# Patient Record
Sex: MALE | Race: WHITE | Employment: OTHER | URBAN - METROPOLITAN AREA
[De-identification: names, ages, dates, MRNs, and addresses within clinical notes are randomized per-mention and may not be internally consistent; named-entity substitution may affect disease eponyms.]

---

## 2020-02-14 ENCOUNTER — HOSPITAL ENCOUNTER (OUTPATIENT)
Dept: PREADMISSION TESTING | Age: 80
Discharge: HOME | End: 2020-02-14
Payer: MEDICARE

## 2020-02-14 VITALS
HEART RATE: 87 BPM | BODY MASS INDEX: 20.05 KG/M2 | WEIGHT: 148 LBS | HEIGHT: 72 IN | TEMPERATURE: 98.1 F | SYSTOLIC BLOOD PRESSURE: 127 MMHG | RESPIRATION RATE: 18 BRPM | DIASTOLIC BLOOD PRESSURE: 76 MMHG

## 2020-02-14 LAB
ANION GAP SERPL CALC-SCNC: 9 MMOL/L (ref 5–15)
ATRIAL RATE: 55 BPM
BASOPHILS # BLD: 0 K/UL (ref 0–0.1)
BASOPHILS NFR BLD: 0 % (ref 0–1)
BUN SERPL-MCNC: 28 MG/DL (ref 6–20)
BUN/CREAT SERPL: 18 (ref 12–20)
CALCIUM SERPL-MCNC: 8.6 MG/DL (ref 8.5–10.1)
CALCULATED R AXIS, ECG10: 14 DEGREES
CALCULATED T AXIS, ECG11: 47 DEGREES
CHLORIDE SERPL-SCNC: 99 MMOL/L (ref 97–108)
CO2 SERPL-SCNC: 27 MMOL/L (ref 21–32)
CREAT SERPL-MCNC: 1.54 MG/DL (ref 0.7–1.3)
DIAGNOSIS, 93000: NORMAL
DIFFERENTIAL METHOD BLD: ABNORMAL
EOSINOPHIL # BLD: 0 K/UL (ref 0–0.4)
EOSINOPHIL NFR BLD: 0 % (ref 0–7)
ERYTHROCYTE [DISTWIDTH] IN BLOOD BY AUTOMATED COUNT: 12.5 % (ref 11.5–14.5)
GLUCOSE SERPL-MCNC: 94 MG/DL (ref 65–100)
HCT VFR BLD AUTO: 34.8 % (ref 36.6–50.3)
HGB BLD-MCNC: 11.8 G/DL (ref 12.1–17)
IMM GRANULOCYTES # BLD AUTO: 0.1 K/UL (ref 0–0.04)
IMM GRANULOCYTES NFR BLD AUTO: 1 % (ref 0–0.5)
LYMPHOCYTES # BLD: 0.9 K/UL (ref 0.8–3.5)
LYMPHOCYTES NFR BLD: 7 % (ref 12–49)
MCH RBC QN AUTO: 31.4 PG (ref 26–34)
MCHC RBC AUTO-ENTMCNC: 33.9 G/DL (ref 30–36.5)
MCV RBC AUTO: 92.6 FL (ref 80–99)
MONOCYTES # BLD: 1.4 K/UL (ref 0–1)
MONOCYTES NFR BLD: 11 % (ref 5–13)
NEUTS SEG # BLD: 10.9 K/UL (ref 1.8–8)
NEUTS SEG NFR BLD: 81 % (ref 32–75)
NRBC # BLD: 0 K/UL (ref 0–0.01)
NRBC BLD-RTO: 0 PER 100 WBC
PLATELET # BLD AUTO: 286 K/UL (ref 150–400)
PMV BLD AUTO: 10 FL (ref 8.9–12.9)
POTASSIUM SERPL-SCNC: 2.8 MMOL/L (ref 3.5–5.1)
Q-T INTERVAL, ECG07: 446 MS
QRS DURATION, ECG06: 154 MS
QTC CALCULATION (BEZET), ECG08: 498 MS
RBC # BLD AUTO: 3.76 M/UL (ref 4.1–5.7)
SODIUM SERPL-SCNC: 135 MMOL/L (ref 136–145)
VENTRICULAR RATE, ECG03: 75 BPM
WBC # BLD AUTO: 13.3 K/UL (ref 4.1–11.1)

## 2020-02-14 PROCEDURE — 80048 BASIC METABOLIC PNL TOTAL CA: CPT

## 2020-02-14 PROCEDURE — 93005 ELECTROCARDIOGRAM TRACING: CPT

## 2020-02-14 PROCEDURE — 85025 COMPLETE CBC W/AUTO DIFF WBC: CPT

## 2020-02-14 RX ORDER — DILTIAZEM HYDROCHLORIDE 120 MG/1
120 CAPSULE, EXTENDED RELEASE ORAL DAILY
COMMUNITY

## 2020-02-14 RX ORDER — MELATONIN
2000 DAILY
COMMUNITY

## 2020-02-14 RX ORDER — POTASSIUM CHLORIDE 750 MG/1
10 TABLET, FILM COATED, EXTENDED RELEASE ORAL DAILY
COMMUNITY

## 2020-02-14 RX ORDER — ROSUVASTATIN CALCIUM 10 MG/1
5 TABLET, COATED ORAL
COMMUNITY

## 2020-02-14 RX ORDER — LEVOTHYROXINE SODIUM 100 UG/1
100 TABLET ORAL
COMMUNITY

## 2020-02-14 RX ORDER — ALENDRONATE SODIUM 70 MG/1
70 TABLET ORAL
COMMUNITY

## 2020-02-14 RX ORDER — LANOLIN ALCOHOL/MO/W.PET/CERES
CREAM (GRAM) TOPICAL
COMMUNITY

## 2020-02-14 NOTE — PERIOP NOTES
DO NOT EAT OR DRINK ANYTHING AFTER MIDNIGHT, except as instructed THE NIGHT BEFORE SURGERY. PT GIVEN OPPORTUNITY TO ASK ADDITIONAL QUESTIONS. Patient given two bottles CHG soap and instruction sheet, instructions for use reviewed with patient. Patient given surgical site infection information FAQs handout and hand hygiene tips sheet. Pre-operative instructions reviewed and patient verbalizes understanding of instructions. Patient has been given the opportunity to ask additional questions. DR Armaan Smith OFFICE CALLED REQUESTING MOST RECENT OFFICE NOTES. DR Shira SALOMON OFFICE CALLED REQUESTING MOST RECENT OFFICE NOTES.

## 2020-02-16 NOTE — H&P
EPIC MRN: [id number]      CARE TEAM:  Patient Care Team:  No Pcp as PCP - General (General Practice)     ASSESSMENT:  1. Other closed extra-articular fracture of distal end of left radius, initial encounter          There is no problem list on file for this patient.       PLAN:  Treatment Plan:  We discussed operative and non operative options. Patient and family have requested operative management. ORIF. Regional anesthesia. Discussed reasonable expectations. We discussed usual postoperative course. He has given informed consent to proceed. Ice and elevation in the interim. We were able to remove his ring with some difficulty.         Orders Placed This Encounter    Cast application      Follow-up: Return for first postoperative visit.      HISTORY OF PRESENT ILLNESS:  Chief Complaint: Injury and Fall of the Left Wrist     Age: 78 y.o. Sex: male   History of present illness: Maite Adame a pleasant 78 y.o. male who presents today for evaluation of left wrist injury. He had a recent fall. Pain and swelling. Some deformity. X-rays have revealed a fracture. He has been in a splint. Pain reasonably well controlled. He lives independently with his wife to require some care due to dementia. He does have a cardiac history     Past medical history, past surgical history, medications, allergies, social history, and review of systems have been reviewed by me. No current facility-administered medications on file prior to encounter. No current outpatient medications on file prior to encounter.        Past Medical History:   Diagnosis Date    A-fib [facility])     Acute myocardial ischemia ([facility] [address] 2015    h/o    Adverse effect of anesthesia     SLOW TO WAKE POST ANESTHESIA    Arthritis     Asthma     AS A CHILD    CAD (coronary artery disease)     STENT PLACED (? YEAR) >5 YEARS    Cancer ([facility] [address]     PROSTATE    Chronic kidney disease     h/o, NO LONGER A PROBLEM    [facility] pulmonary disease ([facility]     GERD (gastroesophageal reflux disease)     h/o, NO LONGER A PROBLEM    Hypertension     Psychiatric disorder     NOT CURRENTLY    PUD (peptic ulcer disease)     Thyroid disease     HYPOTHYROID       Allergies   Allergen Reactions    Shellfish Derived Hives       Social History     Socioeconomic History    Marital status:      Spouse name: Not on file    Number of children: Not on file    Years of education: Not on file    Highest education level: Not on file   Occupational History    Not on file   Social Needs    Financial resource strain: Not on file    Food insecurity:     Worry: Not on file     Inability: Not on file    Transportation needs:     Medical: Not on file     Non-medical: Not on file   Tobacco Use    Smoking status: Former Smoker     Packs/day: 1.50     Last attempt to quit:      Years since quittin.1    Smokeless tobacco: Never Used   Substance and Sexual Activity    Alcohol use: Yes     Comment: RARE    Drug use: Not Currently    Sexual activity: Not on file   Lifestyle    Physical activity:     Days per week: Not on file     Minutes per session: Not on file    Stress: Not on file   Relationships    Social connections:     Talks on phone: Not on file     Gets together: Not on file     Attends Protestant service: Not on file     Active member of club or organization: Not on file     Attends meetings of clubs or organizations: Not on file     Relationship status: Not on file    Intimate partner violence:     Fear of current or ex partner: Not on file     Emotionally abused: Not on file     Physically abused: Not on file     Forced sexual activity: Not on file   Other Topics Concern    Not on file   Social History Narrative    Not on file       Allergies   Allergen Reactions    Shellfish Derived Hives        Review of Systems   2020    Constitutional: Unexplained: Negative  Genitourinary: Frequent Urination: Negative  HEENT: Vision Loss: Positive  Neurological: Memory Loss: Positive  Integumentary: Rash: Negative  Cardiovascular: Palpatations: Positive  Hematologic: Bruises/Bleeds Easily: Negative  Gastrointestinal: Constipation: Negative  Immunological: Seasonal Allergies: Positive  Musculoskeletal: Joint Pain: Positive        OBJECTIVE:  Constitutional:  No acute distress. Pleasant and cooperative with exam.  HEENT: Normocephalic. Atraumatic. Eyes are clear  Hearing intact to spoken word   Respiratory:  Breathing unlabored. .  Cardiovascular:  No marked edema. Psychiatric: Alert.  Affect appropriate. Gait: Normal.  Musculoskeletal    Skin is intact  He has deformity of the wrist.  Swelling. Full digital motion. Intact sensibility.     IMAGING / STUDIES:           No imaging obtained       Independently reviewed his x-ray images.   He has a displaced distal radius fracture.     PROCEDURES:  Cast application  Performed by: Caroline Lovett MD  Authorized by: Caroline Lovett MD   Location:  Wrist  Site:  Left wrist  Manipulation Performed?: No    Pediatric?: No    Immobilization:  Splint  Splint Type:  Short arm  Supplies:  Fiberglass

## 2020-02-17 ENCOUNTER — ANESTHESIA EVENT (OUTPATIENT)
Dept: MEDSURG UNIT | Age: 80
End: 2020-02-17
Payer: MEDICARE

## 2020-02-17 ENCOUNTER — ANESTHESIA (OUTPATIENT)
Dept: MEDSURG UNIT | Age: 80
End: 2020-02-17
Payer: MEDICARE

## 2020-02-17 ENCOUNTER — HOSPITAL ENCOUNTER (OUTPATIENT)
Age: 80
Setting detail: OUTPATIENT SURGERY
Discharge: HOME | End: 2020-02-17
Attending: ORTHOPAEDIC SURGERY | Admitting: ORTHOPAEDIC SURGERY
Payer: MEDICARE

## 2020-02-17 VITALS
BODY MASS INDEX: 19.91 KG/M2 | SYSTOLIC BLOOD PRESSURE: 140 MMHG | HEIGHT: 72 IN | HEART RATE: 87 BPM | RESPIRATION RATE: 13 BRPM | WEIGHT: 147 LBS | DIASTOLIC BLOOD PRESSURE: 69 MMHG | TEMPERATURE: 97.7 F | OXYGEN SATURATION: 99 %

## 2020-02-17 DIAGNOSIS — S52.552A OTHER CLOSED EXTRA-ARTICULAR FRACTURE OF DISTAL END OF LEFT RADIUS, INITIAL ENCOUNTER: Primary | ICD-10-CM

## 2020-02-17 PROCEDURE — C1713 ANCHOR/SCREW BN/BN,TIS/BN: HCPCS | Performed by: ORTHOPAEDIC SURGERY

## 2020-02-17 PROCEDURE — 77030003601 HC NDL NRV BLK BBMI -A

## 2020-02-17 PROCEDURE — 77030040361 HC SLV COMPR DVT MDII -B: Performed by: ORTHOPAEDIC SURGERY

## 2020-02-17 PROCEDURE — 74011250636 HC RX REV CODE- 250/636: Performed by: ANESTHESIOLOGY

## 2020-02-17 PROCEDURE — 77030031139 HC SUT VCRL2 J&J -A: Performed by: ORTHOPAEDIC SURGERY

## 2020-02-17 PROCEDURE — 77030035485 HC BIT DRL DVR XLOK QC BIOM -C: Performed by: ORTHOPAEDIC SURGERY

## 2020-02-17 PROCEDURE — 77030040922 HC BLNKT HYPOTHRM STRY -A

## 2020-02-17 PROCEDURE — 76060000061 HC AMB SURG ANES 0.5 TO 1 HR: Performed by: ORTHOPAEDIC SURGERY

## 2020-02-17 PROCEDURE — 77030002916 HC SUT ETHLN J&J -A: Performed by: ORTHOPAEDIC SURGERY

## 2020-02-17 PROCEDURE — 76210000046 HC AMBSU PH II REC FIRST 0.5 HR: Performed by: ORTHOPAEDIC SURGERY

## 2020-02-17 PROCEDURE — 77030008832 HC WRE K ZIMM -B: Performed by: ORTHOPAEDIC SURGERY

## 2020-02-17 PROCEDURE — 77030018836 HC SOL IRR NACL ICUM -A: Performed by: ORTHOPAEDIC SURGERY

## 2020-02-17 PROCEDURE — 76210000035 HC AMBSU PH I REC 1 TO 1.5 HR: Performed by: ORTHOPAEDIC SURGERY

## 2020-02-17 PROCEDURE — 77030040356 HC CORD BPLR FRCP COVD -A: Performed by: ORTHOPAEDIC SURGERY

## 2020-02-17 PROCEDURE — 77030020754 HC CUF TRNQT 2BLA STRY -B: Performed by: ORTHOPAEDIC SURGERY

## 2020-02-17 PROCEDURE — 74011250636 HC RX REV CODE- 250/636: Performed by: NURSE ANESTHETIST, CERTIFIED REGISTERED

## 2020-02-17 PROCEDURE — 76030000018 HC AMB SURG 0.5 TO 1 HR INTENSV-TIER 1: Performed by: ORTHOPAEDIC SURGERY

## 2020-02-17 PROCEDURE — 77030028224 HC PDNG CST BSNM -A: Performed by: ORTHOPAEDIC SURGERY

## 2020-02-17 DEVICE — SCR BNE LCK SQ DRV 2.7X18MM --: Type: IMPLANTABLE DEVICE | Site: ARM | Status: FUNCTIONAL

## 2020-02-17 DEVICE — SCR BNE NLCK LO PROF 2.7X18MM -- DVR CROSSLOCK: Type: IMPLANTABLE DEVICE | Site: ARM | Status: FUNCTIONAL

## 2020-02-17 DEVICE — SCREW BNE FIX L18MM DIA2.2MM DST VOLAR RAD SMOOTH LOK PEG: Type: IMPLANTABLE DEVICE | Site: ARM | Status: FUNCTIONAL

## 2020-02-17 DEVICE — PLATE BNE DSTL RAD LT 24X51 -- DVR CROSSLOCK: Type: IMPLANTABLE DEVICE | Site: ARM | Status: FUNCTIONAL

## 2020-02-17 RX ORDER — CEFAZOLIN SODIUM/WATER 2 G/20 ML
2 SYRINGE (ML) INTRAVENOUS ONCE
Status: DISCONTINUED | OUTPATIENT
Start: 2020-02-17 | End: 2020-02-17 | Stop reason: HOSPADM

## 2020-02-17 RX ORDER — HYDROMORPHONE HYDROCHLORIDE 1 MG/ML
0.2 INJECTION, SOLUTION INTRAMUSCULAR; INTRAVENOUS; SUBCUTANEOUS
Status: DISCONTINUED | OUTPATIENT
Start: 2020-02-17 | End: 2020-02-17 | Stop reason: HOSPADM

## 2020-02-17 RX ORDER — ONDANSETRON 2 MG/ML
4 INJECTION INTRAMUSCULAR; INTRAVENOUS AS NEEDED
Status: DISCONTINUED | OUTPATIENT
Start: 2020-02-17 | End: 2020-02-17 | Stop reason: HOSPADM

## 2020-02-17 RX ORDER — FENTANYL CITRATE 50 UG/ML
INJECTION, SOLUTION INTRAMUSCULAR; INTRAVENOUS AS NEEDED
Status: DISCONTINUED | OUTPATIENT
Start: 2020-02-17 | End: 2020-02-17 | Stop reason: HOSPADM

## 2020-02-17 RX ORDER — HYDROCODONE BITARTRATE AND ACETAMINOPHEN 5; 325 MG/1; MG/1
1 TABLET ORAL AS NEEDED
Status: DISCONTINUED | OUTPATIENT
Start: 2020-02-17 | End: 2020-02-17 | Stop reason: HOSPADM

## 2020-02-17 RX ORDER — HYDROCODONE BITARTRATE AND ACETAMINOPHEN 5; 325 MG/1; MG/1
1 TABLET ORAL
Qty: 15 TAB | Refills: 0 | Status: SHIPPED | OUTPATIENT
Start: 2020-02-17 | End: 2020-02-20

## 2020-02-17 RX ORDER — SODIUM CHLORIDE 0.9 % (FLUSH) 0.9 %
5-40 SYRINGE (ML) INJECTION EVERY 8 HOURS
Status: DISCONTINUED | OUTPATIENT
Start: 2020-02-17 | End: 2020-02-17 | Stop reason: HOSPADM

## 2020-02-17 RX ORDER — SODIUM CHLORIDE, SODIUM LACTATE, POTASSIUM CHLORIDE, CALCIUM CHLORIDE 600; 310; 30; 20 MG/100ML; MG/100ML; MG/100ML; MG/100ML
125 INJECTION, SOLUTION INTRAVENOUS CONTINUOUS
Status: DISCONTINUED | OUTPATIENT
Start: 2020-02-17 | End: 2020-02-17 | Stop reason: HOSPADM

## 2020-02-17 RX ORDER — SODIUM CHLORIDE 9 MG/ML
25 INJECTION, SOLUTION INTRAVENOUS CONTINUOUS
Status: DISCONTINUED | OUTPATIENT
Start: 2020-02-17 | End: 2020-02-17 | Stop reason: HOSPADM

## 2020-02-17 RX ORDER — PROPOFOL 10 MG/ML
INJECTION, EMULSION INTRAVENOUS AS NEEDED
Status: DISCONTINUED | OUTPATIENT
Start: 2020-02-17 | End: 2020-02-17 | Stop reason: HOSPADM

## 2020-02-17 RX ORDER — DEXAMETHASONE SODIUM PHOSPHATE 4 MG/ML
INJECTION, SOLUTION INTRA-ARTICULAR; INTRALESIONAL; INTRAMUSCULAR; INTRAVENOUS; SOFT TISSUE AS NEEDED
Status: DISCONTINUED | OUTPATIENT
Start: 2020-02-17 | End: 2020-02-17 | Stop reason: HOSPADM

## 2020-02-17 RX ORDER — MIDAZOLAM HYDROCHLORIDE 1 MG/ML
1 INJECTION, SOLUTION INTRAMUSCULAR; INTRAVENOUS AS NEEDED
Status: DISCONTINUED | OUTPATIENT
Start: 2020-02-17 | End: 2020-02-17 | Stop reason: HOSPADM

## 2020-02-17 RX ORDER — ONDANSETRON 2 MG/ML
INJECTION INTRAMUSCULAR; INTRAVENOUS AS NEEDED
Status: DISCONTINUED | OUTPATIENT
Start: 2020-02-17 | End: 2020-02-17 | Stop reason: HOSPADM

## 2020-02-17 RX ORDER — SODIUM CHLORIDE 0.9 % (FLUSH) 0.9 %
5-40 SYRINGE (ML) INJECTION AS NEEDED
Status: DISCONTINUED | OUTPATIENT
Start: 2020-02-17 | End: 2020-02-17 | Stop reason: HOSPADM

## 2020-02-17 RX ORDER — FENTANYL CITRATE 50 UG/ML
25 INJECTION, SOLUTION INTRAMUSCULAR; INTRAVENOUS
Status: COMPLETED | OUTPATIENT
Start: 2020-02-17 | End: 2020-02-17

## 2020-02-17 RX ORDER — LIDOCAINE HYDROCHLORIDE 10 MG/ML
0.1 INJECTION, SOLUTION EPIDURAL; INFILTRATION; INTRACAUDAL; PERINEURAL AS NEEDED
Status: DISCONTINUED | OUTPATIENT
Start: 2020-02-17 | End: 2020-02-17 | Stop reason: HOSPADM

## 2020-02-17 RX ORDER — ROPIVACAINE HYDROCHLORIDE 5 MG/ML
INJECTION, SOLUTION EPIDURAL; INFILTRATION; PERINEURAL
Status: COMPLETED | OUTPATIENT
Start: 2020-02-17 | End: 2020-02-17

## 2020-02-17 RX ORDER — SODIUM CHLORIDE, SODIUM LACTATE, POTASSIUM CHLORIDE, CALCIUM CHLORIDE 600; 310; 30; 20 MG/100ML; MG/100ML; MG/100ML; MG/100ML
75 INJECTION, SOLUTION INTRAVENOUS CONTINUOUS
Status: DISCONTINUED | OUTPATIENT
Start: 2020-02-17 | End: 2020-02-17 | Stop reason: HOSPADM

## 2020-02-17 RX ORDER — PROPOFOL 10 MG/ML
INJECTION, EMULSION INTRAVENOUS
Status: DISCONTINUED | OUTPATIENT
Start: 2020-02-17 | End: 2020-02-17 | Stop reason: HOSPADM

## 2020-02-17 RX ORDER — SODIUM CHLORIDE 9 MG/ML
50 INJECTION, SOLUTION INTRAVENOUS CONTINUOUS
Status: DISCONTINUED | OUTPATIENT
Start: 2020-02-17 | End: 2020-02-17 | Stop reason: HOSPADM

## 2020-02-17 RX ORDER — FENTANYL CITRATE 50 UG/ML
50 INJECTION, SOLUTION INTRAMUSCULAR; INTRAVENOUS AS NEEDED
Status: DISCONTINUED | OUTPATIENT
Start: 2020-02-17 | End: 2020-02-17 | Stop reason: HOSPADM

## 2020-02-17 RX ORDER — DIPHENHYDRAMINE HYDROCHLORIDE 50 MG/ML
12.5 INJECTION, SOLUTION INTRAMUSCULAR; INTRAVENOUS AS NEEDED
Status: DISCONTINUED | OUTPATIENT
Start: 2020-02-17 | End: 2020-02-17 | Stop reason: HOSPADM

## 2020-02-17 RX ORDER — MIDAZOLAM HYDROCHLORIDE 1 MG/ML
0.5 INJECTION, SOLUTION INTRAMUSCULAR; INTRAVENOUS
Status: DISCONTINUED | OUTPATIENT
Start: 2020-02-17 | End: 2020-02-17 | Stop reason: HOSPADM

## 2020-02-17 RX ORDER — ROPIVACAINE HYDROCHLORIDE 5 MG/ML
20 INJECTION, SOLUTION EPIDURAL; INFILTRATION; PERINEURAL
Status: COMPLETED | OUTPATIENT
Start: 2020-02-17 | End: 2020-02-17

## 2020-02-17 RX ORDER — MORPHINE SULFATE 10 MG/ML
2 INJECTION, SOLUTION INTRAMUSCULAR; INTRAVENOUS
Status: DISCONTINUED | OUTPATIENT
Start: 2020-02-17 | End: 2020-02-17 | Stop reason: HOSPADM

## 2020-02-17 RX ORDER — SODIUM CHLORIDE, SODIUM LACTATE, POTASSIUM CHLORIDE, CALCIUM CHLORIDE 600; 310; 30; 20 MG/100ML; MG/100ML; MG/100ML; MG/100ML
INJECTION, SOLUTION INTRAVENOUS
Status: DISCONTINUED | OUTPATIENT
Start: 2020-02-17 | End: 2020-02-17 | Stop reason: HOSPADM

## 2020-02-17 RX ADMIN — ROPIVACAINE HYDROCHLORIDE 100 MG: 5 INJECTION, SOLUTION EPIDURAL; INFILTRATION; PERINEURAL at 11:00

## 2020-02-17 RX ADMIN — FENTANYL CITRATE 25 MCG: 50 INJECTION, SOLUTION INTRAMUSCULAR; INTRAVENOUS at 12:13

## 2020-02-17 RX ADMIN — SODIUM CHLORIDE, POTASSIUM CHLORIDE, SODIUM LACTATE AND CALCIUM CHLORIDE: 600; 310; 30; 20 INJECTION, SOLUTION INTRAVENOUS at 11:05

## 2020-02-17 RX ADMIN — ONDANSETRON HYDROCHLORIDE 4 MG: 2 INJECTION, SOLUTION INTRAVENOUS at 11:14

## 2020-02-17 RX ADMIN — MIDAZOLAM 1 MG: 1 INJECTION INTRAMUSCULAR; INTRAVENOUS at 10:55

## 2020-02-17 RX ADMIN — FENTANYL CITRATE 25 MCG: 50 INJECTION, SOLUTION INTRAMUSCULAR; INTRAVENOUS at 12:03

## 2020-02-17 RX ADMIN — FENTANYL CITRATE 25 MCG: 50 INJECTION, SOLUTION INTRAMUSCULAR; INTRAVENOUS at 12:08

## 2020-02-17 RX ADMIN — FENTANYL CITRATE 25 MCG: 50 INJECTION, SOLUTION INTRAMUSCULAR; INTRAVENOUS at 11:53

## 2020-02-17 RX ADMIN — PROPOFOL 30 MG: 10 INJECTION, EMULSION INTRAVENOUS at 11:21

## 2020-02-17 RX ADMIN — FENTANYL CITRATE 25 MCG: 50 INJECTION, SOLUTION INTRAMUSCULAR; INTRAVENOUS at 11:24

## 2020-02-17 RX ADMIN — PROPOFOL 75 MCG/KG/MIN: 10 INJECTION, EMULSION INTRAVENOUS at 11:09

## 2020-02-17 RX ADMIN — DEXAMETHASONE SODIUM PHOSPHATE 4 MG: 4 INJECTION, SOLUTION INTRAMUSCULAR; INTRAVENOUS at 11:14

## 2020-02-17 RX ADMIN — FENTANYL CITRATE 50 MCG: 50 INJECTION, SOLUTION INTRAMUSCULAR; INTRAVENOUS at 10:55

## 2020-02-17 RX ADMIN — ROPIVACAINE HYDROCHLORIDE 20 ML: 5 INJECTION, SOLUTION EPIDURAL; INFILTRATION; PERINEURAL at 12:19

## 2020-02-17 RX ADMIN — FENTANYL CITRATE 25 MCG: 50 INJECTION, SOLUTION INTRAMUSCULAR; INTRAVENOUS at 11:21

## 2020-02-17 NOTE — INTERVAL H&P NOTE
H&P Update: 
Marilyn Barba was seen and examined. History and physical has been reviewed. The patient has been examined.  There have been no significant clinical changes since the completion of the originally dated History and Physical.

## 2020-02-17 NOTE — DISCHARGE INSTRUCTIONS
Dr. Jose Dugan Upper Extremity Postoperative Instructions      1. Please maintain the dressing and /or splint placed at surgery until your follow up appointment where it will be removed. Please keep the dressing clean and dry. If you have any questions or problems, please call our office at [phone]. 2. Please elevate the operative extremity to the level of the heart to keep swelling at a minimum. You may get up to move around, but when seated please keep the extremity elevated as much as possible. This will decrease swelling and pain. 3. You were told to be non-weight bearing following surgery. Please do not lift anything heavier than 1 lb with your operative hand. 4. You may ice your Arm/Hand 5 times a day for 20 minutes at a time. 5. You had a block from the Anesthesiologist before surgery. Expect this to wear off around 12-24 hours after you received it. You should start taking your pain medication before the feeling begins to come back into your arm/ hand. 6. A prescription for pain medication is provided. The key to pain control is staying ahead of the pain. For the first 48-72 hours after your surgery, you may want to take your pain medication on a regular schedule. After that, you may only need it on an as needed basis. 7. If you experience any redness, increased pain, increased swelling not relieved by elevation, drainage, fever, or chills, please call the office at [phone], and speak with Ally Márquez or [place]. Please Follow-up at my office [address] [facility], [address] in 2 weeks unless otherwise directed.

## 2020-02-17 NOTE — ANESTHESIA POSTPROCEDURE EVALUATION
Procedure(s):  OPEN REDUCTION INTERNAL FIXATION LEFT DISTAL RADIUS FRACTURE (REGIONAL W/BLOCK). MAC    <BSHSIANPOST>    Vitals Value Taken Time   /85 2/17/2020 11:45 AM   Temp 36.5 °C (97.7 °F) 2/17/2020 11:45 AM   Pulse 87 2/17/2020 12:15 PM   Resp 26 2/17/2020 12:15 PM   SpO2 99 % 2/17/2020 12:15 PM   Vitals shown include unvalidated device data.

## 2020-02-17 NOTE — ANESTHESIA PROCEDURE NOTES
Peripheral Block    Start time: 2/17/2020 10:39 AM  End time: 2/17/2020 10:44 AM  Performed by: Junior Jj MD  Authorized by: Junior Jj MD       Pre-procedure:    Indications: at surgeon's request, post-op pain management and primary anesthetic    Preanesthetic Checklist: patient identified, risks and benefits discussed, site marked, timeout performed, anesthesia consent given and patient being monitored      Block Type:   Block Type:  Supraclavicular  Laterality:  Left  Monitoring:  Standard ASA monitoring, oxygen, continuous pulse ox, heart rate, frequent vital sign checks and responsive to questions  Injection Technique:  Single shot  Procedures: ultrasound guided    Patient Position: supine  Needle Type:  Stimuplex  Needle Gauge:  22 G  Needle Localization:  Nerve stimulator and ultrasound guidance  Motor Response: minimal motor response >0.4 mA      Assessment:    Injection Assessment:  Incremental injection every 5 mL, no paresthesia, no intravascular symptoms, negative aspiration for blood and local visualized surrounding nerve on ultrasound  Patient tolerance:  Patient tolerated the procedure well with no immediate complications

## 2020-02-17 NOTE — ANESTHESIA PREPROCEDURE EVALUATION
Relevant Problems   No relevant active problems       Anesthetic History               Review of Systems / Medical History      Pulmonary            Asthma        Neuro/Psych              Cardiovascular    Hypertension          CAD         GI/Hepatic/Renal     GERD      PUD     Endo/Other      Hypothyroidism       Other Findings              Physical Exam    Airway  Mallampati: II  TM Distance: > 6 cm  Neck ROM: normal range of motion   Mouth opening: Normal     Cardiovascular  Regular rate and rhythm,  S1 and S2 normal,  no murmur, click, rub, or gallop             Dental  No notable dental hx       Pulmonary  Breath sounds clear to auscultation               Abdominal  GI exam deferred       Other Findings            Anesthetic Plan    ASA: 3  Anesthesia type: regional            Anesthetic plan and risks discussed with: Patient

## 2020-02-17 NOTE — PERIOP NOTES
All belongings returned to pt ( hearing aids bilat in pts ears/ glasses on face/ sling on ).  Clothing/ jacket on pt

## 2020-02-17 NOTE — ROUTINE PROCESS
Patient: Lauren Mistry MRN: [id number]  SSN: [id number] YOB: 1940  Age: 78 y.o. Sex: male Patient is status post Procedure(s): OPEN REDUCTION INTERNAL FIXATION LEFT DISTAL RADIUS FRACTURE (REGIONAL W/BLOCK). Surgeon(s) and Role: 
   * Ashu Smith MD - Primary Local/Dose/Irrigation:   
             
Peripheral IV 02/17/20 Right Forearm (Active) Site Assessment Clean, dry, & intact 2/17/2020 10:39 AM  
Phlebitis Assessment 0 2/17/2020 10:39 AM  
Infiltration Assessment 0 2/17/2020 10:39 AM  
Dressing Status Clean, dry, & intact 2/17/2020 10:39 AM  
Dressing Type Tape;Transparent 2/17/2020 10:39 AM  
Hub Color/Line Status Blue; Infusing 2/17/2020 10:39 AM  
                 
 
 
 
Dressing/Packing:      
Splint/Cast:  ] Other:

## 2020-02-17 NOTE — OP NOTES
PREOPERATIVE DIAGNOSIS: Closed left displaced extraarticular distal radius fracture.     POSTOPERATIVE DIAGNOSIS: Closed left displaced extraarticular distal radius fracture.     PROCEDURES PERFORMED: Open reduction and internal fixation of left closed displaced extraarticular distal radius fracture.     SURGEON: [place]. Alexis Favre, MD     ANESTHESIA: Regional.     ESTIMATED BLOOD LOSS: Minimal.     SPECIMENS REMOVED: None.     COMPLICATIONS: None.     IMPLANTS: DVR Crosslink volar locking plate.     INDICATIONS FOR PROCEDURE: This is an 79 yo male who   fell and sustained a left displaced distal radius fracture. She was indicated for operative fixation. We discussed risks, benefits, potential complications and alternatives to surgery, and he gave informed consent to proceed.     DESCRIPTION OF PROCEDURE: The patient was identified in the   preoperative holding area. Informed consent was obtained. The   operative site was marked. Regional anesthesia was then established   by the anesthesia team. He was then transported to the and placed   supine on the operating table. All bony prominences were well-padded. A tourniquet was applied to the upper brachium. After induction of   deep sedation, the left upper extremity was sterilely prepped and   draped in the usual fashion. Surgical time-out was held, and the   operative site was confirmed. Preoperative antibiotics were used. After   Esmarch examination, the tourniquet was elevated to 250 mmHg.      A longitudinal incision was made over the FCR tendon. Sharp   dissection was performed through skin and subcutaneous tissues. The   FCR was retracted ulnarly. The deep fascia was incised. The FPL was   swept off of the distal radius. The brachioradialis was released sharply   off of the radial styloid. The pronator was incised sharply off of the   radial margin of the radius. The fracture was easily exposed.  The   fracture was then reduced and held in place with provisional K-wire   fixation. The volar plate was then applied and held in place   provisionally with multiple K-wires. Fluoroscopy was brought in and   confirmed good reduction of the fracture and good alignment of the   hardware. Multiple locking pegs were placed distally and multiple   locking and nonlocking screws were placed proximally. K-wires were   removed. The wrist and forearm were taken through a full range of   motion without crepitus.      The wound was thoroughly irrigated. The pronator was closed with a 3-  0 Vicryl and the skin was closed with 3-0 Vicryl and 4-0 nylon. Sterile   dressings were applied. A splint was applied. The tourniquet was let   down and brisk capillary refill returned to the digits. He was awakened   from light sedation and transferred to the PACU in stable condition   without complication.

## (undated) DEVICE — DRAPE CARM MINI FOR IMAG SYS INSIGHT FLROSCN

## (undated) DEVICE — BIT DRL QC 2.2MM NS DISP --

## (undated) DEVICE — SPONGE GZ W4XL4IN COT 12 PLY TYP VII WVN C FLD DSGN

## (undated) DEVICE — PADDING CST 4INX4YD --

## (undated) DEVICE — GARMENT,MEDLINE,DVT,INT,CALF,MED, GEN2: Brand: MEDLINE

## (undated) DEVICE — (D)PREP SKN CHLRAPRP APPL 26ML -- CONVERT TO ITEM 371833

## (undated) DEVICE — DRAPE,HAND,STERILE: Brand: MEDLINE

## (undated) DEVICE — Device

## (undated) DEVICE — TRNQT CUFF RMFG 2PRT 18X4 RED -- LAWSON OEM ITEM 312877

## (undated) DEVICE — SOLUTION IV 1000ML 0.9% SOD CHL

## (undated) DEVICE — SPLINT CAST PLASTER 4X15FT -- DYNACAST

## (undated) DEVICE — BANDAGE COMPR 9 FTX4 IN SMOOTH COMFORTABLE SYNTH ESMRK LF

## (undated) DEVICE — GOWN,SIRUS,NONRNF,SETINSLV,XL,20/CS: Brand: MEDLINE

## (undated) DEVICE — BNDG ELAS HK LOOP 4X5YD NS -- MATRIX

## (undated) DEVICE — DRAPE,REIN 53X77,STERILE: Brand: MEDLINE

## (undated) DEVICE — BIPOLAR FORCEPS CORD: Brand: VALLEYLAB

## (undated) DEVICE — INFECTION CONTROL KIT SYS

## (undated) DEVICE — SUTURE ETHLN SZ 4-0 L18IN NONABSORBABLE BLK L19MM PS-2 3/8 1667H

## (undated) DEVICE — HANDLE LT SNAP ON ULT DURABLE LENS FOR TRUMPF ALC DISPOSABLE

## (undated) DEVICE — SUTURE VCRL SZ 3-0 L27IN ABSRB UD L26MM SH 1/2 CIR J416H

## (undated) DEVICE — Z DISCONTINUED GLOVE SURG SZ 7.5 L12IN FNGR THK13MIL WHT ISOLEX

## (undated) DEVICE — DRESSING,GAUZE,XEROFORM,CURAD,1"X8",ST: Brand: CURAD